# Patient Record
Sex: FEMALE | Race: BLACK OR AFRICAN AMERICAN | NOT HISPANIC OR LATINO | URBAN - METROPOLITAN AREA
[De-identification: names, ages, dates, MRNs, and addresses within clinical notes are randomized per-mention and may not be internally consistent; named-entity substitution may affect disease eponyms.]

---

## 2024-04-19 ENCOUNTER — EMERGENCY (EMERGENCY)
Facility: HOSPITAL | Age: 19
LOS: 1 days | Discharge: HOME | End: 2024-04-19
Attending: EMERGENCY MEDICINE | Admitting: EMERGENCY MEDICINE
Payer: SELF-PAY

## 2024-04-19 VITALS
TEMPERATURE: 98 F | HEART RATE: 81 BPM | SYSTOLIC BLOOD PRESSURE: 128 MMHG | DIASTOLIC BLOOD PRESSURE: 87 MMHG | OXYGEN SATURATION: 98 % | RESPIRATION RATE: 18 BRPM

## 2024-04-19 DIAGNOSIS — F12.90 CANNABIS USE, UNSPECIFIED, UNCOMPLICATED: ICD-10-CM

## 2024-04-19 DIAGNOSIS — R11.2 NAUSEA WITH VOMITING, UNSPECIFIED: ICD-10-CM

## 2024-04-19 LAB
ALBUMIN SERPL ELPH-MCNC: 4.2 G/DL — SIGNIFICANT CHANGE UP (ref 3.4–5)
ALP SERPL-CCNC: 86 U/L — SIGNIFICANT CHANGE UP (ref 40–120)
ALT FLD-CCNC: 25 U/L — SIGNIFICANT CHANGE UP (ref 12–42)
ANION GAP SERPL CALC-SCNC: 16 MMOL/L — SIGNIFICANT CHANGE UP (ref 9–16)
AST SERPL-CCNC: 20 U/L — SIGNIFICANT CHANGE UP (ref 15–37)
BASOPHILS # BLD AUTO: 0.04 K/UL — SIGNIFICANT CHANGE UP (ref 0–0.2)
BASOPHILS NFR BLD AUTO: 0.3 % — SIGNIFICANT CHANGE UP (ref 0–2)
BILIRUB SERPL-MCNC: 0.5 MG/DL — SIGNIFICANT CHANGE UP (ref 0.2–1.2)
BUN SERPL-MCNC: 14 MG/DL — SIGNIFICANT CHANGE UP (ref 7–23)
CALCIUM SERPL-MCNC: 9.7 MG/DL — SIGNIFICANT CHANGE UP (ref 8.5–10.5)
CHLORIDE SERPL-SCNC: 105 MMOL/L — SIGNIFICANT CHANGE UP (ref 96–108)
CO2 SERPL-SCNC: 20 MMOL/L — LOW (ref 22–31)
CREAT SERPL-MCNC: 0.93 MG/DL — SIGNIFICANT CHANGE UP (ref 0.5–1.3)
EGFR: 91 ML/MIN/1.73M2 — SIGNIFICANT CHANGE UP
EOSINOPHIL # BLD AUTO: 0.05 K/UL — SIGNIFICANT CHANGE UP (ref 0–0.5)
EOSINOPHIL NFR BLD AUTO: 0.4 % — SIGNIFICANT CHANGE UP (ref 0–6)
GLUCOSE SERPL-MCNC: 121 MG/DL — HIGH (ref 70–99)
HCG SERPL-ACNC: 1 MIU/ML — SIGNIFICANT CHANGE UP
HCT VFR BLD CALC: 39.1 % — SIGNIFICANT CHANGE UP (ref 34.5–45)
HGB BLD-MCNC: 13.2 G/DL — SIGNIFICANT CHANGE UP (ref 11.5–15.5)
IMM GRANULOCYTES NFR BLD AUTO: 0.5 % — SIGNIFICANT CHANGE UP (ref 0–0.9)
LACTATE BLDV-MCNC: 1.9 MMOL/L — SIGNIFICANT CHANGE UP (ref 0.5–2)
LACTATE BLDV-MCNC: 3.6 MMOL/L — HIGH (ref 0.5–2)
LIDOCAIN IGE QN: 19 U/L — SIGNIFICANT CHANGE UP (ref 16–77)
LYMPHOCYTES # BLD AUTO: 2.51 K/UL — SIGNIFICANT CHANGE UP (ref 1–3.3)
LYMPHOCYTES # BLD AUTO: 20.9 % — SIGNIFICANT CHANGE UP (ref 13–44)
MCHC RBC-ENTMCNC: 28.9 PG — SIGNIFICANT CHANGE UP (ref 27–34)
MCHC RBC-ENTMCNC: 33.8 GM/DL — SIGNIFICANT CHANGE UP (ref 32–36)
MCV RBC AUTO: 85.7 FL — SIGNIFICANT CHANGE UP (ref 80–100)
MONOCYTES # BLD AUTO: 0.94 K/UL — HIGH (ref 0–0.9)
MONOCYTES NFR BLD AUTO: 7.8 % — SIGNIFICANT CHANGE UP (ref 2–14)
NEUTROPHILS # BLD AUTO: 8.41 K/UL — HIGH (ref 1.8–7.4)
NEUTROPHILS NFR BLD AUTO: 70.1 % — SIGNIFICANT CHANGE UP (ref 43–77)
NRBC # BLD: 0 /100 WBCS — SIGNIFICANT CHANGE UP (ref 0–0)
PCO2 BLDV: 25 MMHG — LOW (ref 39–42)
PH BLDV: 7.54 — HIGH (ref 7.32–7.43)
PLATELET # BLD AUTO: 297 K/UL — SIGNIFICANT CHANGE UP (ref 150–400)
PO2 BLDV: <35 MMHG — SIGNIFICANT CHANGE UP (ref 25–45)
POTASSIUM SERPL-MCNC: 3.4 MMOL/L — LOW (ref 3.5–5.3)
POTASSIUM SERPL-SCNC: 3.4 MMOL/L — LOW (ref 3.5–5.3)
PROT SERPL-MCNC: 8.2 G/DL — SIGNIFICANT CHANGE UP (ref 6.4–8.2)
RBC # BLD: 4.56 M/UL — SIGNIFICANT CHANGE UP (ref 3.8–5.2)
RBC # FLD: 12.1 % — SIGNIFICANT CHANGE UP (ref 10.3–14.5)
SAO2 % BLDV: 59.9 % — LOW (ref 67–88)
SODIUM SERPL-SCNC: 141 MMOL/L — SIGNIFICANT CHANGE UP (ref 132–145)
WBC # BLD: 12.01 K/UL — HIGH (ref 3.8–10.5)
WBC # FLD AUTO: 12.01 K/UL — HIGH (ref 3.8–10.5)

## 2024-04-19 PROCEDURE — 99223 1ST HOSP IP/OBS HIGH 75: CPT

## 2024-04-19 RX ORDER — FAMOTIDINE 10 MG/ML
20 INJECTION INTRAVENOUS ONCE
Refills: 0 | Status: COMPLETED | OUTPATIENT
Start: 2024-04-19 | End: 2024-04-19

## 2024-04-19 RX ORDER — METOCLOPRAMIDE HCL 10 MG
10 TABLET ORAL ONCE
Refills: 0 | Status: DISCONTINUED | OUTPATIENT
Start: 2024-04-19 | End: 2024-04-19

## 2024-04-19 RX ORDER — SODIUM CHLORIDE 9 MG/ML
1000 INJECTION, SOLUTION INTRAVENOUS
Refills: 0 | Status: DISCONTINUED | OUTPATIENT
Start: 2024-04-19 | End: 2024-04-23

## 2024-04-19 RX ORDER — METOCLOPRAMIDE HCL 10 MG
10 TABLET ORAL ONCE
Refills: 0 | Status: COMPLETED | OUTPATIENT
Start: 2024-04-19 | End: 2024-04-19

## 2024-04-19 RX ORDER — ONDANSETRON 8 MG/1
4 TABLET, FILM COATED ORAL ONCE
Refills: 0 | Status: COMPLETED | OUTPATIENT
Start: 2024-04-19 | End: 2024-04-19

## 2024-04-19 RX ORDER — SODIUM CHLORIDE 9 MG/ML
1000 INJECTION INTRAMUSCULAR; INTRAVENOUS; SUBCUTANEOUS ONCE
Refills: 0 | Status: COMPLETED | OUTPATIENT
Start: 2024-04-19 | End: 2024-04-19

## 2024-04-19 RX ORDER — DIPHENHYDRAMINE HCL 50 MG
25 CAPSULE ORAL ONCE
Refills: 0 | Status: COMPLETED | OUTPATIENT
Start: 2024-04-19 | End: 2024-04-19

## 2024-04-19 RX ORDER — HALOPERIDOL DECANOATE 100 MG/ML
5 INJECTION INTRAMUSCULAR ONCE
Refills: 0 | Status: COMPLETED | OUTPATIENT
Start: 2024-04-19 | End: 2024-04-19

## 2024-04-19 RX ADMIN — SODIUM CHLORIDE 250 MILLILITER(S): 9 INJECTION, SOLUTION INTRAVENOUS at 22:27

## 2024-04-19 RX ADMIN — Medication 104 MILLIGRAM(S): at 22:27

## 2024-04-19 RX ADMIN — SODIUM CHLORIDE 1000 MILLILITER(S): 9 INJECTION INTRAMUSCULAR; INTRAVENOUS; SUBCUTANEOUS at 16:11

## 2024-04-19 RX ADMIN — HALOPERIDOL DECANOATE 5 MILLIGRAM(S): 100 INJECTION INTRAMUSCULAR at 15:39

## 2024-04-19 RX ADMIN — FAMOTIDINE 20 MILLIGRAM(S): 10 INJECTION INTRAVENOUS at 15:39

## 2024-04-19 RX ADMIN — SODIUM CHLORIDE 1000 MILLILITER(S): 9 INJECTION INTRAMUSCULAR; INTRAVENOUS; SUBCUTANEOUS at 15:40

## 2024-04-19 RX ADMIN — Medication 25 MILLIGRAM(S): at 15:39

## 2024-04-19 RX ADMIN — ONDANSETRON 4 MILLIGRAM(S): 8 TABLET, FILM COATED ORAL at 15:39

## 2024-04-19 NOTE — ED ADULT NURSE NOTE - NSFALLRISKINTERV_ED_ALL_ED

## 2024-04-19 NOTE — ED PROVIDER NOTE - CLINICAL SUMMARY MEDICAL DECISION MAKING FREE TEXT BOX
HPI this is a 18-year-old female patient with no history of medical problems, possible hyperemesis cannabinoid syndrome, uses THC daily.  Patient presents for persistent nausea vomiting since she had lunch at [employer] today.  Patient denies any other medical complaints at this time.  Denies any abdominal pain prior to nausea vomiting.  No abdominal surgeries.    EKG is a normal sinus rhythm with a heart rate of 82, no ST-T changes.    On physical examination patient is in distress due to active nausea and retching.  Otherwise patient is in bed leaning on her side and refuses to lay on her back for examination.  With this limitation lung sounds are normal, heart sounds are normal, abdomen is nontender diffusely, soft benign.  Neurological exam patient seems somnolent but able to provide history with nods of the head and monosyllables.    MDM will work up patient for nausea vomiting and clinical dehydration.  Will get blood work, will treat symptomatically with antiemetics, fluids.  Since there is a possibility for hyperemesis secondary to cannabis we will also treated with IM Haldol and dose of IV Benadryl and rest of supportive treatment.  Will reassess once results are available.  As patient is young and healthy otherwise we will try to avoid CT scan but if does not improve with symptomatic treatment and will consider it and discussed with patient later.

## 2024-04-19 NOTE — ED PROVIDER NOTE - PROGRESS NOTE DETAILS
patient with improved retching and active vomiting while in the ER.  Still has not been able to tolerate p.o. so we will place in ops for continued hydration and to progress diet.  Will add CT Abdo as well. patient with improved retching and active vomiting while in the ER.  Still has not been able to tolerate p.o. so we will place in ops for continued hydration and to progress diet.

## 2024-04-19 NOTE — ED ADULT NURSE NOTE - OBJECTIVE STATEMENT
Patient BIBA for persistent vomiting s/p eating [employer] started today. Patient is actively vomiting in the ED multiple time and in distress. Denies abdominal pain, diarrhea, fever, chills, SOB, CP.

## 2024-04-20 VITALS
HEART RATE: 96 BPM | OXYGEN SATURATION: 98 % | RESPIRATION RATE: 18 BRPM | DIASTOLIC BLOOD PRESSURE: 68 MMHG | SYSTOLIC BLOOD PRESSURE: 119 MMHG | TEMPERATURE: 97 F

## 2024-04-20 PROCEDURE — 99238 HOSP IP/OBS DSCHRG MGMT 30/<: CPT

## 2024-04-20 RX ORDER — ONDANSETRON 8 MG/1
1 TABLET, FILM COATED ORAL
Qty: 9 | Refills: 0
Start: 2024-04-20 | End: 2024-04-22

## 2024-04-20 RX ORDER — SODIUM CHLORIDE 9 MG/ML
1000 INJECTION INTRAMUSCULAR; INTRAVENOUS; SUBCUTANEOUS ONCE
Refills: 0 | Status: COMPLETED | OUTPATIENT
Start: 2024-04-20 | End: 2024-04-20

## 2024-04-20 RX ORDER — FAMOTIDINE 10 MG/ML
1 INJECTION INTRAVENOUS
Qty: 10 | Refills: 0
Start: 2024-04-20 | End: 2024-04-24

## 2024-04-20 RX ADMIN — SODIUM CHLORIDE 1000 MILLILITER(S): 9 INJECTION INTRAMUSCULAR; INTRAVENOUS; SUBCUTANEOUS at 00:43

## 2024-04-20 NOTE — ED CDU PROVIDER DISPOSITION NOTE - CLINICAL COURSE
Patient endorsed to me by Dr. Gaytan pending re-eval for nausea and vomiting.  Patient slept majority of the night, used bathroom several times to urinate.  Vitals stable. Patient tolerating PO upon discharge.

## 2024-04-20 NOTE — ED CDU PROVIDER DISPOSITION NOTE - NSFOLLOWUPINSTRUCTIONS_ED_ALL_ED_FT
Cannabinoid Hyperemesis Syndrome  Cannabinoid hyperemesis syndrome (CHS) is a condition that causes repeated nausea, vomiting, and abdominal pain after long-term use of marijuana (cannabis). People with CHS typically use marijuana 3–5 times a day for many years before they have symptoms, although it is possible to develop CHS with far less daily use.    Symptoms of CHS may be mild at first but can get worse and more frequent. In some cases, CHS may cause severe daily vomiting, which can lead to weight loss and dehydration.    What are the causes?  The exact cause of CHS is not known. Long-term use of marijuana may overstimulate certain proteins in the brain and digestive tract that react with chemicals in marijuana (cannabinoid receptors). This overstimulation may cause CHS.    What are the signs or symptoms?  Symptoms of CHS are often mild during the first few episodes, but they can get worse over time. Symptoms may include:  Frequent nausea, especially early in the morning.  Vomiting. This can become severe.  Abdominal pain.  Feeling very tired (lethargic).  Headaches.  CHS may go away and come back many times (recur). People may not have symptoms or may otherwise be healthy in between CHS episodes. Taking hot showers can relieve the symptoms of CHS, so feeling the need to take several hot showers throughout the day can be a sign of this condition.    How is this diagnosed?  CHS may be diagnosed based on:  Your symptoms and medical history, including any drug use.  A physical exam.  You may have tests done to rule out other problems that could cause your symptoms. These tests may include:  Blood tests.  Urine tests.  Imaging tests, such as an X-ray or a CT scan.  How is this treated?  Treatment for this condition involves stopping marijuana use. Treatment may include:  A drug rehab program, if you have trouble stopping marijuana use.  Medicines for nausea. These may be given at the hospital through an IV inserted into one of your veins, or they may be medicines that you take by mouth (orally).  Certain creams that contain a substance called capsaicin. These may improve symptoms when applied to the abdomen.  Hot showers to help relieve symptoms.  In severe cases, you may need treatment at a hospital. You may be given IV fluids to prevent or treat dehydration as well as medicines to treat nausea, vomiting, and pain.    Follow these instructions at home:  During an episode of CHS    Water spraying out of a showerhead.  Stay in bed and rest in a dark, quiet room.  Take anti-nausea medicine as told by your health care provider.  Try taking hot showers to relieve your symptoms.  After an episode of CHS    Drink small amounts of clear fluids. Slowly add more if you can keep the fluids down without vomiting.  Once you are able to eat without vomiting, eat soft foods in small amounts every 3–4 hours.  General instructions    Do not use any products that contain marijuana.If you need help quitting, ask your health care provider for resources and treatment options.  Drink enough fluid to keep your urine pale yellow. Avoid drinking fluids that have a lot of sugar or caffeine, such as coffee and soda.  Take and apply over-the-counter and prescription medicines only as told by your health care provider. Ask your health care provider before starting any new medicines or treatments.  Keep all follow-up visits. This includes any recommended programs for substance use disorders.  Contact a health care provider if:  Your symptoms get worse.  You cannot drink fluids without vomiting or severe pain.  You have pain and trouble swallowing after an episode.  Get help right away if:  You cannot stop vomiting.  You have blood in your vomit or your vomit looks like coffee grounds.  You have severe abdominal pain.  You have stools that are bloody or black, or stools that look like tar.  You have symptoms of dehydration, such as:  Sunken eyes.  Inability to make tears.  Cracked lips or dry mouth.  Decreased urine production.  Weakness.  Sleepiness.  Dizziness, light-headedness, or fainting.  These symptoms may be an emergency. Get help right away. Call 911.  Do not wait to see if the symptoms will go away.  Do not drive yourself to the hospital.  Summary  Cannabinoid hyperemesis syndrome (CHS) is a condition that causes repeated nausea, vomiting, and abdominal pain after long-term use of marijuana.  Treatment for this condition involves stopping marijuana use. Hot showers and capsaicin creams may also help relieve symptoms.  Your health care provider may prescribe medicines to help with nausea.  Ask your health care provider before starting any medicines or other treatments.  This information is not intended to replace advice given to you by your health care provider. Make sure you discuss any questions you have with your health care provider.    Document Revised: 04/17/2023 Document Reviewed: 04/17/2023  [employer] Patient Education © 2024 [employer]

## 2024-04-20 NOTE — ED CDU PROVIDER DISPOSITION NOTE - PATIENT PORTAL LINK FT
You can access the FollowMyHealth Patient Portal offered by [facility] by registering at the following website: [email or web address]. By joining [employer]’s FollowMyHealth portal, you will also be able to view your health information using other applications (apps) compatible with our system.

## 2024-05-16 ENCOUNTER — EMERGENCY (EMERGENCY)
Facility: HOSPITAL | Age: 19
LOS: 1 days | Discharge: HOME | End: 2024-05-16
Attending: EMERGENCY MEDICINE | Admitting: EMERGENCY MEDICINE
Payer: MEDICAID

## 2024-05-16 VITALS
TEMPERATURE: 98 F | OXYGEN SATURATION: 98 % | HEART RATE: 69 BPM | SYSTOLIC BLOOD PRESSURE: 115 MMHG | DIASTOLIC BLOOD PRESSURE: 75 MMHG | RESPIRATION RATE: 18 BRPM

## 2024-05-16 DIAGNOSIS — R11.10 VOMITING, UNSPECIFIED: ICD-10-CM

## 2024-05-16 DIAGNOSIS — R11.2 NAUSEA WITH VOMITING, UNSPECIFIED: ICD-10-CM

## 2024-05-16 LAB
ALBUMIN SERPL ELPH-MCNC: 4.1 G/DL — SIGNIFICANT CHANGE UP (ref 3.4–5)
ALP SERPL-CCNC: 100 U/L — SIGNIFICANT CHANGE UP (ref 40–120)
ALT FLD-CCNC: 34 U/L — SIGNIFICANT CHANGE UP (ref 12–42)
AMPHET UR-MCNC: NEGATIVE — SIGNIFICANT CHANGE UP
ANION GAP SERPL CALC-SCNC: 12 MMOL/L — SIGNIFICANT CHANGE UP (ref 9–16)
APPEARANCE UR: CLEAR — SIGNIFICANT CHANGE UP
AST SERPL-CCNC: 32 U/L — SIGNIFICANT CHANGE UP (ref 15–37)
BACTERIA # UR AUTO: ABNORMAL /HPF
BARBITURATES UR SCN-MCNC: NEGATIVE — SIGNIFICANT CHANGE UP
BASOPHILS # BLD AUTO: 0.02 K/UL — SIGNIFICANT CHANGE UP (ref 0–0.2)
BASOPHILS NFR BLD AUTO: 0.2 % — SIGNIFICANT CHANGE UP (ref 0–2)
BENZODIAZ UR-MCNC: NEGATIVE — SIGNIFICANT CHANGE UP
BILIRUB SERPL-MCNC: 0.3 MG/DL — SIGNIFICANT CHANGE UP (ref 0.2–1.2)
BILIRUB UR-MCNC: NEGATIVE — SIGNIFICANT CHANGE UP
BUN SERPL-MCNC: 15 MG/DL — SIGNIFICANT CHANGE UP (ref 7–23)
CALCIUM SERPL-MCNC: 9.8 MG/DL — SIGNIFICANT CHANGE UP (ref 8.5–10.5)
CHLORIDE SERPL-SCNC: 102 MMOL/L — SIGNIFICANT CHANGE UP (ref 96–108)
CO2 SERPL-SCNC: 21 MMOL/L — LOW (ref 22–31)
COCAINE METAB.OTHER UR-MCNC: NEGATIVE — SIGNIFICANT CHANGE UP
COLOR SPEC: YELLOW — SIGNIFICANT CHANGE UP
CREAT SERPL-MCNC: 1.02 MG/DL — SIGNIFICANT CHANGE UP (ref 0.5–1.3)
DIFF PNL FLD: NEGATIVE — SIGNIFICANT CHANGE UP
EGFR: 81 ML/MIN/1.73M2 — SIGNIFICANT CHANGE UP
EOSINOPHIL # BLD AUTO: 0.09 K/UL — SIGNIFICANT CHANGE UP (ref 0–0.5)
EOSINOPHIL NFR BLD AUTO: 0.7 % — SIGNIFICANT CHANGE UP (ref 0–6)
EPI CELLS # UR: 4 — SIGNIFICANT CHANGE UP
GLUCOSE SERPL-MCNC: 106 MG/DL — HIGH (ref 70–99)
GLUCOSE UR QL: NEGATIVE MG/DL — SIGNIFICANT CHANGE UP
HCG SERPL-ACNC: <1 MIU/ML — SIGNIFICANT CHANGE UP
HCT VFR BLD CALC: 44.2 % — SIGNIFICANT CHANGE UP (ref 34.5–45)
HGB BLD-MCNC: 14.6 G/DL — SIGNIFICANT CHANGE UP (ref 11.5–15.5)
IMM GRANULOCYTES NFR BLD AUTO: 0.9 % — SIGNIFICANT CHANGE UP (ref 0–0.9)
KETONES UR-MCNC: 40 MG/DL
LACTATE BLDV-MCNC: 1.8 MMOL/L — SIGNIFICANT CHANGE UP (ref 0.5–2)
LEUKOCYTE ESTERASE UR-ACNC: NEGATIVE — SIGNIFICANT CHANGE UP
LIDOCAIN IGE QN: 28 U/L — SIGNIFICANT CHANGE UP (ref 16–77)
LYMPHOCYTES # BLD AUTO: 1.16 K/UL — SIGNIFICANT CHANGE UP (ref 1–3.3)
LYMPHOCYTES # BLD AUTO: 9.5 % — LOW (ref 13–44)
MAGNESIUM SERPL-MCNC: 1.5 MG/DL — LOW (ref 1.6–2.6)
MCHC RBC-ENTMCNC: 28.6 PG — SIGNIFICANT CHANGE UP (ref 27–34)
MCHC RBC-ENTMCNC: 33 GM/DL — SIGNIFICANT CHANGE UP (ref 32–36)
MCV RBC AUTO: 86.7 FL — SIGNIFICANT CHANGE UP (ref 80–100)
METHADONE UR-MCNC: NEGATIVE — SIGNIFICANT CHANGE UP
MONOCYTES # BLD AUTO: 0.79 K/UL — SIGNIFICANT CHANGE UP (ref 0–0.9)
MONOCYTES NFR BLD AUTO: 6.5 % — SIGNIFICANT CHANGE UP (ref 2–14)
NEUTROPHILS # BLD AUTO: 9.98 K/UL — HIGH (ref 1.8–7.4)
NEUTROPHILS NFR BLD AUTO: 82.2 % — HIGH (ref 43–77)
NITRITE UR-MCNC: NEGATIVE — SIGNIFICANT CHANGE UP
NRBC # BLD: 0 /100 WBCS — SIGNIFICANT CHANGE UP (ref 0–0)
OPIATES UR-MCNC: NEGATIVE — SIGNIFICANT CHANGE UP
PCP SPEC-MCNC: SIGNIFICANT CHANGE UP
PCP UR-MCNC: NEGATIVE — SIGNIFICANT CHANGE UP
PH UR: 7.5 — SIGNIFICANT CHANGE UP (ref 5–8)
PLATELET # BLD AUTO: 294 K/UL — SIGNIFICANT CHANGE UP (ref 150–400)
POTASSIUM SERPL-MCNC: 4.3 MMOL/L — SIGNIFICANT CHANGE UP (ref 3.5–5.3)
POTASSIUM SERPL-SCNC: 4.3 MMOL/L — SIGNIFICANT CHANGE UP (ref 3.5–5.3)
PROT SERPL-MCNC: 8.5 G/DL — HIGH (ref 6.4–8.2)
PROT UR-MCNC: 30 MG/DL
RBC # BLD: 5.1 M/UL — SIGNIFICANT CHANGE UP (ref 3.8–5.2)
RBC # FLD: 12.7 % — SIGNIFICANT CHANGE UP (ref 10.3–14.5)
RBC CASTS # UR COMP ASSIST: 1 /HPF — SIGNIFICANT CHANGE UP (ref 0–4)
SODIUM SERPL-SCNC: 135 MMOL/L — SIGNIFICANT CHANGE UP (ref 132–145)
SP GR SPEC: >=1.099 (ref 1–1.03)
THC UR QL: POSITIVE
UROBILINOGEN FLD QL: 0.2 MG/DL — SIGNIFICANT CHANGE UP (ref 0.2–1)
WBC # BLD: 12.15 K/UL — HIGH (ref 3.8–10.5)
WBC # FLD AUTO: 12.15 K/UL — HIGH (ref 3.8–10.5)
WBC UR QL: 2 /HPF — SIGNIFICANT CHANGE UP (ref 0–5)

## 2024-05-16 RX ORDER — METOCLOPRAMIDE HCL 10 MG
10 TABLET ORAL ONCE
Refills: 0 | Status: COMPLETED | OUTPATIENT
Start: 2024-05-16 | End: 2024-05-16

## 2024-05-16 RX ORDER — HALOPERIDOL DECANOATE 100 MG/ML
5 INJECTION INTRAMUSCULAR ONCE
Refills: 0 | Status: DISCONTINUED | OUTPATIENT
Start: 2024-05-16 | End: 2024-05-16

## 2024-05-16 RX ORDER — SODIUM CHLORIDE 9 MG/ML
1000 INJECTION INTRAMUSCULAR; INTRAVENOUS; SUBCUTANEOUS ONCE
Refills: 0 | Status: COMPLETED | OUTPATIENT
Start: 2024-05-16 | End: 2024-05-16

## 2024-05-16 RX ORDER — HALOPERIDOL DECANOATE 100 MG/ML
3 INJECTION INTRAMUSCULAR ONCE
Refills: 0 | Status: COMPLETED | OUTPATIENT
Start: 2024-05-16 | End: 2024-05-16

## 2024-05-16 RX ORDER — FAMOTIDINE 10 MG/ML
20 INJECTION INTRAVENOUS ONCE
Refills: 0 | Status: COMPLETED | OUTPATIENT
Start: 2024-05-16 | End: 2024-05-16

## 2024-05-16 RX ORDER — HALOPERIDOL DECANOATE 100 MG/ML
2 INJECTION INTRAMUSCULAR ONCE
Refills: 0 | Status: COMPLETED | OUTPATIENT
Start: 2024-05-16 | End: 2024-05-16

## 2024-05-16 RX ORDER — SODIUM CHLORIDE 9 MG/ML
1000 INJECTION, SOLUTION INTRAVENOUS
Refills: 0 | Status: DISCONTINUED | OUTPATIENT
Start: 2024-05-16 | End: 2024-05-20

## 2024-05-16 RX ORDER — ONDANSETRON 8 MG/1
4 TABLET, FILM COATED ORAL ONCE
Refills: 0 | Status: COMPLETED | OUTPATIENT
Start: 2024-05-16 | End: 2024-05-16

## 2024-05-16 RX ORDER — HALOPERIDOL DECANOATE 100 MG/ML
3 INJECTION INTRAMUSCULAR ONCE
Refills: 0 | Status: DISCONTINUED | OUTPATIENT
Start: 2024-05-16 | End: 2024-05-20

## 2024-05-16 RX ORDER — KETOROLAC TROMETHAMINE 30 MG/ML
30 SYRINGE (ML) INJECTION ONCE
Refills: 0 | Status: DISCONTINUED | OUTPATIENT
Start: 2024-05-16 | End: 2024-05-16

## 2024-05-16 RX ADMIN — Medication 30 MILLIGRAM(S): at 19:19

## 2024-05-16 RX ADMIN — HALOPERIDOL DECANOATE 3 MILLIGRAM(S): 100 INJECTION INTRAMUSCULAR at 23:11

## 2024-05-16 RX ADMIN — SODIUM CHLORIDE 1000 MILLILITER(S): 9 INJECTION INTRAMUSCULAR; INTRAVENOUS; SUBCUTANEOUS at 17:16

## 2024-05-16 RX ADMIN — SODIUM CHLORIDE 500 MILLILITER(S): 9 INJECTION, SOLUTION INTRAVENOUS at 23:12

## 2024-05-16 RX ADMIN — ONDANSETRON 4 MILLIGRAM(S): 8 TABLET, FILM COATED ORAL at 21:56

## 2024-05-16 RX ADMIN — FAMOTIDINE 20 MILLIGRAM(S): 10 INJECTION INTRAVENOUS at 19:19

## 2024-05-16 RX ADMIN — HALOPERIDOL DECANOATE 2 MILLIGRAM(S): 100 INJECTION INTRAMUSCULAR at 20:50

## 2024-05-16 RX ADMIN — Medication 10 MILLIGRAM(S): at 17:16

## 2024-05-16 NOTE — ED PROVIDER NOTE - OBJECTIVE STATEMENT
18-year-old female, no medical history, presents this emergency department for nausea, vomiting, abdominal cramping since 1 PM today.  Patient states that she had some pineapple that may have been bad.  However has had a history of this, usually due to cannabis, however last time she smoked marijuana was 2 months ago.  States that cramping is "everywhere".  Denies any alcohol use today.States that she did take Zofran prior to arrival.

## 2024-05-16 NOTE — ED PROVIDER NOTE - CLINICAL SUMMARY MEDICAL DECISION MAKING FREE TEXT BOX
18-year-old female presents this emergency department for nausea, vomiting, abdominal cramping  On arrival vital signs are stable and physical exam is grossly unremarkable  Labs, lactate, lipase ordered  Fluids, Reglan ordered  Will continue to monitor patient

## 2024-05-16 NOTE — ED PROVIDER NOTE - PROGRESS NOTE DETAILS
Patient is still feeling the same, actively vomiting  2 mg of Haldol ordered  Will continue to monitor patient Patient states that she still feels the same  Pepcid, Toradol ordered for symptomatic relief  CTAP ordered  Will continue to monitor patient Patient is still feeling the same, actively vomiting  2 mg of Haldol ordered  Will place patient on observation continue to monitor

## 2024-05-16 NOTE — ED CDU PROVIDER INITIAL DAY NOTE - PROGRESS NOTE DETAILS
CALIN Kelsey and myself have accepted sign out from overnight team, CALIN Langston and MD Burciaga, for this patient with hyperemesis.  Pt currently doing PO trial as she has improved while on observation.  We will round on pt and re-evaluate.

## 2024-05-16 NOTE — ED PROVIDER NOTE - PHYSICAL EXAMINATION
General: well developed, well nourished, moderate distress, actively vomiting  Eye: bilateral: PERRL, EOMI  Ears, Nose, Throat: normal pharynx, TMs normal, membranes moist.  Neck: non-tender, full range of motion, supple.  Negative For: lymphadenopathy (R), lymphadenopathy (L)  Respiratory: CTAB.  Cardiovascular: S1-S2 normal, regular rate, regular rhythm.  Abdomen: normal bowel sounds, non tender, soft.    Genitourinary: Negative For: CVA tenderness  Neurologic: alert, oriented to person, oriented to place, oriented to time.    Skin: normal color.  Negative For: rash  Psychiatric: normal affect, normal insight, normal concentration

## 2024-05-16 NOTE — ED ADULT NURSE NOTE - OBJECTIVE STATEMENT
Patient is a 17 y/o F c/o vomitting. Patient reports vomitting since this morning. Patient reports she only ate pineapple and watermelon prior to arrival. Patient denies fever or chills. Patient denies blood in vomit.

## 2024-05-17 VITALS
HEART RATE: 97 BPM | OXYGEN SATURATION: 98 % | TEMPERATURE: 98 F | RESPIRATION RATE: 16 BRPM | DIASTOLIC BLOOD PRESSURE: 73 MMHG | SYSTOLIC BLOOD PRESSURE: 114 MMHG

## 2024-05-17 RX ORDER — ONDANSETRON 8 MG/1
1 TABLET, FILM COATED ORAL
Qty: 1 | Refills: 0
Start: 2024-05-17

## 2024-05-17 RX ORDER — OMEPRAZOLE 10 MG/1
1 CAPSULE, DELAYED RELEASE ORAL
Qty: 14 | Refills: 0
Start: 2024-05-17 | End: 2024-05-30

## 2024-05-17 RX ADMIN — Medication 30 MILLIGRAM(S): at 02:04

## 2024-05-17 RX ADMIN — SODIUM CHLORIDE 1000 MILLILITER(S): 9 INJECTION INTRAMUSCULAR; INTRAVENOUS; SUBCUTANEOUS at 02:05

## 2024-05-17 NOTE — ED CDU PROVIDER DISPOSITION NOTE - PATIENT PORTAL LINK FT
You can access the FollowMyHealth Patient Portal offered by [facility] by registering at the following website: [email or web address]. By joining [employer]’s FollowMyHealth portal, you will also be able to view your health information using other applications (apps) compatible with our system.

## 2024-05-17 NOTE — ED CDU PROVIDER DISPOSITION NOTE - CLINICAL COURSE
Patient has been asymptomatic and resting comfortably since I took over at 8 AM.  Repeat abdominal exam benign.  Patient tolerated p.o. with no issue.  Patient complaining of soreness to throat from vomiting so we will prescribe omeprazole.  We will also prescribe Zofran ODT in case nausea returns.  We gave strict emergent return precautions and instructed patient to follow-up with primary care.

## 2024-05-17 NOTE — ED CDU PROVIDER DISPOSITION NOTE - NSFOLLOWUPINSTRUCTIONS_ED_ALL_ED_FT
-PLEASE FOLLOW-UP WITH YOUR PRIMARY CARE DOCTOR IN 1-2 DAYS.  BRING ALL PAPERWORK FROM TODAY'S VISIT TO YOUR FOLLOW-UP VISIT.    -PLEASE GO TO YOUR PHARMACY TO FILL YOUR PRESCRIPTIONS.  PLEASE START TODAY AND USE AS DIRECTED.  -PLEASE RETURN TO THE ER IMMEDIATELY OR CALL 911 FOR ANY ABDOMINAL PAIN, HIGH FEVER, TROUBLE BREATHING, VOMITING, SEVERE PAIN, OR ANY OTHER CONCERNS.

## 2024-05-17 NOTE — ED CDU PROVIDER DISPOSITION NOTE - NS ED ATTENDING STATEMENT MOD
Attending Only
all risk, benefit, alternatives explained and the patient expressed full understanding.